# Patient Record
Sex: MALE | Race: WHITE | NOT HISPANIC OR LATINO | Employment: FULL TIME | URBAN - METROPOLITAN AREA
[De-identification: names, ages, dates, MRNs, and addresses within clinical notes are randomized per-mention and may not be internally consistent; named-entity substitution may affect disease eponyms.]

---

## 2019-12-21 ENCOUNTER — OFFICE VISIT (OUTPATIENT)
Dept: URGENT CARE | Facility: URGENT CARE | Age: 24
End: 2019-12-21
Payer: COMMERCIAL

## 2019-12-21 VITALS
HEART RATE: 96 BPM | WEIGHT: 210 LBS | SYSTOLIC BLOOD PRESSURE: 128 MMHG | HEIGHT: 69 IN | OXYGEN SATURATION: 99 % | BODY MASS INDEX: 31.1 KG/M2 | DIASTOLIC BLOOD PRESSURE: 79 MMHG | TEMPERATURE: 97.5 F

## 2019-12-21 DIAGNOSIS — R10.84 ABDOMINAL DISCOMFORT, GENERALIZED: Primary | ICD-10-CM

## 2019-12-21 PROCEDURE — 36415 COLL VENOUS BLD VENIPUNCTURE: CPT | Performed by: FAMILY MEDICINE

## 2019-12-21 PROCEDURE — 99203 OFFICE O/P NEW LOW 30 MIN: CPT | Performed by: FAMILY MEDICINE

## 2019-12-21 PROCEDURE — 80053 COMPREHEN METABOLIC PANEL: CPT | Performed by: FAMILY MEDICINE

## 2019-12-21 ASSESSMENT — MIFFLIN-ST. JEOR: SCORE: 1927.93

## 2019-12-21 NOTE — PROGRESS NOTES
"SUBJECTIVE  Colt Mac is a 24 year old male complaints of abdominal pain:    Onset: 2 - 2.5 weeks ago.  He had been taking a fat-burning supplement for about 5 days when the symptoms started.  He has stopped taking that supplement since.    He has noticed a lot of gas, belching, stomach feels irritated and a little nauseated.  Not really much acid feeling/burning in the throat, more so in the stomach.  He's been avoiding spicy/acid foods.  He had loose stools x 2 and has had intermittent stomach cramps described as \"dull\".  No fevers.  No severe abdominal pain, no dysuria, no hematuria, no discharge from the penis, no chest pain.  No recent uri symptoms.  No excessive alcohol intake. No h/o abdominal surgery.  He was online checking on the supplement and noted several others had complained of stomach cramps while taking it.  He has been taking pepto-bismol for the nausea, last taken yesterday.     OBJECTIVE:  /79   Pulse 96   Temp 97.5  F (36.4  C) (Oral)   Ht 1.753 m (5' 9\")   Wt 95.3 kg (210 lb)   SpO2 99%   BMI 31.01 kg/m    GENERAL APPEARANCE: healthy, alert and no distress  HEAD: NC/AT, EOMI, conjunctiva clear. Oropharynx benign.  RESP: lungs clear to auscultation - no rales, rhonchi or wheezes  CV: regular rates and rhythm, normal S1 S2, no murmur noted  ABDOMEN:  soft, mild generalized tenderness to palpation, not distended and bowel sounds normal  SKIN: no suspicious lesions or rashes      ASSESSMENT/PLAN:    ICD-10-CM    1. Abdominal discomfort, generalized R10.84 Comprehensive metabolic panel (BMP + Alb, Alk Phos, ALT, AST, Total. Bili, TP)     Will send of a CMP with the supplement ingestion prior to onset of symptoms.  Discussed we will call if any elevated/concerning values to have him f/u with his PCP for further evaluation.   Gastritis/stomach discomfort and gassy.  Reviewed symptomatic cares in detail and would anticipate these symptoms resolving over the next several days to a " week.  Recheck with PCP if not.     Patient Instructions     Discontinue the pepto-bismol.   Discontinue the fat-burning supplement.  Avoid spicy, acidic, alcoholic beverages and coffee.   Consider a trial of gas-X and/or Pepcid AC.   Anticipate your symptoms improving over the next week, if not, or if any worsening symptoms develop, recheck with your primary provider for further evaluation.

## 2019-12-21 NOTE — PATIENT INSTRUCTIONS
Discontinue the pepto-bismol.   Discontinue the fat-burning supplement.  Avoid spicy, acidic, alcoholic beverages and coffee.   Consider a trial of gas-X and/or Pepcid AC.   Anticipate your symptoms improving over the next week, if not, or if any worsening symptoms develop, recheck with your primary provider for further evaluation.       Patient Education     Abdominal Pain    Abdominal pain is pain in the stomach or belly area. Everyone has this pain from time to time. In many cases it goes away on its own. But abdominal pain can sometimes be due to a serious problem, such as appendicitis. So it s important to know when to seek help.  Causes of abdominal pain  There are many possible causes of abdominal pain. Common causes in adults include:    Constipation, diarrhea, or gas    Stomach acid flowing back up into the esophagus (acid reflux or heartburn)    Severe acid reflux, called GERD (gastroesophageal reflux disease)    A sore in the lining of the stomach or small intestine (peptic ulcer)    Inflammation of the gallbladder, liver, or pancreas    Gallstones or kidney stones    Appendicitis     Intestinal blockage     An internal organ pushing through a muscle or other tissue (hernia)    Urinary tract infections    In women, menstrual cramps, fibroids, or endometriosis    Inflammation or infection of the intestines  Diagnosing the cause of abdominal pain  Your healthcare provider will do a physical exam help find the cause of your pain. If needed, tests will be ordered. Belly pain has many possible causes. So it can be hard to find the reason for your pain. Giving details about your pain can help. Tell your provider where and when you feel the pain, and what makes it better or worse. Also let your provider know if you have other symptoms such as:    Fever    Tiredness    Upset stomach (nausea)    Vomiting    Changes in bathroom habits  Treating abdominal pain  Some causes of pain need emergency medical treatment  right away. These include appendicitis or a bowel blockage. Other problems can be treated with rest, fluids, or medicines. Your healthcare provider can give you specific instructions for treatment or self-care based on what is causing your pain.  If you have vomiting or diarrhea, sip water or other clear fluids. When you are ready to eat solid foods again, start with small amounts of easy-to-digest, low-fat foods. These include apple sauce, toast, or crackers.   When to seek medical care  Call 911 or go to the hospital right away if you:    Can t pass stool and are vomiting    Are vomiting blood or have bloody diarrhea or black, tarry diarrhea    Have chest, neck, or shoulder pain    Feel like you might pass out    Have pain in your shoulder blades with nausea    Have sudden, severe belly pain    Have new, severe pain unlike any you have felt before    Have a belly that is rigid, hard, and tender to touch  Call your healthcare provider if you have:    Pain for more than 5 days    Bloating for more than 2 days    Diarrhea for more than 5 days    A fever of 100.4 F (38 C) or higher, or as directed by your healthcare provider    Pain that gets worse    Weight loss for no reason    Continued lack of appetite    Blood in your stool  How to prevent abdominal pain  Here are some tips to help prevent abdominal pain:    Eat smaller amounts of food at one time.    Avoid greasy, fried, or other high-fat foods.    Avoid foods that give you gas.    Exercise regularly.    Drink plenty of fluids.  To help prevent GERD symptoms:    Quit smoking.    Reduce alcohol and certain foods that increase stomach acid.    Avoid aspirin and over-the-counter pain and fever medicines (NSAIDS or nonsteroidal anti-inflammatory drugs), if possible    Lose extra weight.    Finish eating at least 2 hours before you go to bed or lie down.    Raise the head of your bed.  Date Last Reviewed: 7/1/2016 2000-2018 [employer]. [address] [place] [address]  [place] [address]. All rights reserved. This information is not intended as a substitute for professional medical care. Always follow your healthcare professional's instructions.

## 2019-12-22 LAB
ALBUMIN SERPL-MCNC: 4.4 G/DL (ref 3.4–5)
ALP SERPL-CCNC: 72 U/L (ref 40–150)
ALT SERPL W P-5'-P-CCNC: 32 U/L (ref 0–70)
ANION GAP SERPL CALCULATED.3IONS-SCNC: 2 MMOL/L (ref 3–14)
AST SERPL W P-5'-P-CCNC: 19 U/L (ref 0–45)
BILIRUB SERPL-MCNC: 0.6 MG/DL (ref 0.2–1.3)
BUN SERPL-MCNC: 15 MG/DL (ref 7–30)
CALCIUM SERPL-MCNC: 8.9 MG/DL (ref 8.5–10.1)
CHLORIDE SERPL-SCNC: 107 MMOL/L (ref 94–109)
CO2 SERPL-SCNC: 30 MMOL/L (ref 20–32)
CREAT SERPL-MCNC: 1.1 MG/DL (ref 0.66–1.25)
GFR SERPL CREATININE-BSD FRML MDRD: >90 ML/MIN/{1.73_M2}
GLUCOSE SERPL-MCNC: 100 MG/DL (ref 70–99)
POTASSIUM SERPL-SCNC: 4.5 MMOL/L (ref 3.4–5.3)
PROT SERPL-MCNC: 7.4 G/DL (ref 6.8–8.8)
SODIUM SERPL-SCNC: 139 MMOL/L (ref 133–144)

## 2020-03-01 ENCOUNTER — APPOINTMENT (OUTPATIENT)
Dept: MRI IMAGING | Facility: CLINIC | Age: 25
End: 2020-03-01
Attending: EMERGENCY MEDICINE
Payer: COMMERCIAL

## 2020-03-01 ENCOUNTER — HOSPITAL ENCOUNTER (EMERGENCY)
Facility: CLINIC | Age: 25
Discharge: HOME | End: 2020-03-01
Attending: EMERGENCY MEDICINE | Admitting: EMERGENCY MEDICINE
Payer: COMMERCIAL

## 2020-03-01 VITALS
HEART RATE: 82 BPM | WEIGHT: 214.4 LBS | OXYGEN SATURATION: 98 % | DIASTOLIC BLOOD PRESSURE: 79 MMHG | TEMPERATURE: 98 F | RESPIRATION RATE: 16 BRPM | BODY MASS INDEX: 31.66 KG/M2 | SYSTOLIC BLOOD PRESSURE: 128 MMHG

## 2020-03-01 DIAGNOSIS — G51.0 BELL'S PALSY: ICD-10-CM

## 2020-03-01 PROCEDURE — 99285 EMERGENCY DEPT VISIT HI MDM: CPT | Mod: Z6 | Performed by: EMERGENCY MEDICINE

## 2020-03-01 PROCEDURE — A9585 GADOBUTROL INJECTION: HCPCS | Performed by: RADIOLOGY

## 2020-03-01 PROCEDURE — 25500064 ZZH RX 255 OP 636: Performed by: RADIOLOGY

## 2020-03-01 PROCEDURE — 96374 THER/PROPH/DIAG INJ IV PUSH: CPT | Mod: 59

## 2020-03-01 PROCEDURE — 25000128 H RX IP 250 OP 636: Performed by: EMERGENCY MEDICINE

## 2020-03-01 PROCEDURE — 99285 EMERGENCY DEPT VISIT HI MDM: CPT | Mod: 25

## 2020-03-01 PROCEDURE — 70553 MRI BRAIN STEM W/O & W/DYE: CPT

## 2020-03-01 RX ORDER — CITALOPRAM HYDROBROMIDE 20 MG/1
20 TABLET ORAL DAILY
COMMUNITY
End: 2023-07-26

## 2020-03-01 RX ORDER — GADOBUTROL 604.72 MG/ML
0.1 INJECTION INTRAVENOUS ONCE
Status: COMPLETED | OUTPATIENT
Start: 2020-03-01 | End: 2020-03-01

## 2020-03-01 RX ORDER — POLYVINYL ALCOHOL 14 MG/ML
2 SOLUTION/ DROPS OPHTHALMIC PRN
Qty: 30 ML | Refills: 1 | Status: SHIPPED | OUTPATIENT
Start: 2020-03-01 | End: 2023-07-26

## 2020-03-01 RX ORDER — LORAZEPAM 2 MG/ML
1 INJECTION INTRAMUSCULAR ONCE
Status: COMPLETED | OUTPATIENT
Start: 2020-03-01 | End: 2020-03-01

## 2020-03-01 RX ORDER — VALACYCLOVIR HYDROCHLORIDE 1 G/1
1000 TABLET, FILM COATED ORAL 3 TIMES DAILY
Qty: 21 TABLET | Refills: 0 | Status: SHIPPED | OUTPATIENT
Start: 2020-03-01 | End: 2023-07-26

## 2020-03-01 RX ORDER — PREDNISONE 20 MG/1
60 TABLET ORAL DAILY
Qty: 21 TABLET | Refills: 0 | Status: SHIPPED | OUTPATIENT
Start: 2020-03-01 | End: 2020-03-08

## 2020-03-01 RX ADMIN — LORAZEPAM 1 MG: 2 INJECTION INTRAMUSCULAR; INTRAVENOUS at 13:43

## 2020-03-01 RX ADMIN — GADOBUTROL 9.7 ML: 604.72 INJECTION INTRAVENOUS at 13:55

## 2020-03-01 ASSESSMENT — ENCOUNTER SYMPTOMS
PHOTOPHOBIA: 0
EYE REDNESS: 0
HEADACHES: 0
ARTHRALGIAS: 0
ACTIVITY CHANGE: 0
WEAKNESS: 1
FATIGUE: 0
NUMBNESS: 1
COLOR CHANGE: 0
FEVER: 0
FACIAL ASYMMETRY: 1
NECK STIFFNESS: 0
SHORTNESS OF BREATH: 0
CHILLS: 0
RHINORRHEA: 0
DIFFICULTY URINATING: 0
VOICE CHANGE: 0
APPETITE CHANGE: 0
ABDOMINAL PAIN: 0
CONFUSION: 0

## 2020-03-01 NOTE — ED AVS SNAPSHOT
[facility], [place], Emergency Department  [address] [place] [id number]  Phone:  [phone]                                    Colt Mac   MRN: [id number]    Department:  [facility], Emergency Department   Date of Visit:  3/1/2020           After Visit Summary Signature Page    I have received my discharge instructions, and my questions have been answered. I have discussed any challenges I see with this plan with the nurse or doctor.    ..........................................................................................................................................  Patient/Patient Representative Signature      ..........................................................................................................................................  Patient Representative Print Name and Relationship to Patient    ..................................................               ................................................  Date                                   Time    ..........................................................................................................................................  Reviewed by Signature/Title    ...................................................              ..............................................  Date                                               Time          22EPIC Rev 08/18

## 2020-03-01 NOTE — ED PROVIDER NOTES
[place] EMERGENCY DEPARTMENT ([facility])  3/01/20   History     Chief Complaint   Patient presents with     Numbness     right side of face since saturday 0900     HPI  Colt Mac is a 24 year old male who presents to the Emergency Department for right facial numbness since yesterday morning.  Patient reports that last Wednesday (4 days ago) he noticed some swelling of the taste buds on his tongue with numbness on right side of the tongue primarily.  He did have some loss of taste on the tongue as well in the left frontal quadrant.  He states that he also started to notice some numbness of the cheek and nostril during this time as well.  More of a partial loss of sensation rather than compete loss.  States that today he noticed the numbness expanding throughout the right side of face.  And today noticed some drift on the left side when smiling and states that the right side of his posterior neck feels a bit weaker and so there is some additional tension to compensate in keeping his head straight. He has noticed some differences in eyelid closure on the right side.  He has not had difficulty with speech, gait impairment, drooling, difficulty drinking liquids, tinnitus or ear pain.  Denies really any other neurological deficits including visual changes, extremity weakness or headache.  Patient states that he is otherwise healthy and does not have any suspicion as to what may be causing his symptoms. Symptoms do not improve after drinking fluids.     I have reviewed the Medications, Allergies, Past Medical and Surgical History, and Social History in the [facility] system.  PAST MEDICAL HISTORY:   Past Medical History:   Diagnosis Date     Anxiety        PAST SURGICAL HISTORY: History reviewed. No pertinent surgical history.    FAMILY HISTORY: History reviewed. No pertinent family history.    SOCIAL HISTORY:   Social History     Tobacco Use     Smoking status: Never Smoker     Smokeless tobacco: Never  Used   Substance Use Topics     Alcohol use: Yes       Patient's Medications   New Prescriptions    POLYVINYL ALCOHOL (LIQUIFILM TEARS) 1.4 % OPHTHALMIC SOLUTION    Place 2 drops into the right eye as needed for dry eyes    PREDNISONE (DELTASONE) 20 MG TABLET    Take 3 tablets (60 mg) by mouth daily for 7 days    VALACYCLOVIR (VALTREX) 1000 MG TABLET    Take 1 tablet (1,000 mg) by mouth 3 times daily for 7 days   Previous Medications    CITALOPRAM (CELEXA) 20 MG TABLET    Take 20 mg by mouth daily    ORDER FOR DME    right youth colles splint   Modified Medications    No medications on file   Discontinued Medications    No medications on file        No Known Allergies     Review of Systems   Constitutional: Negative for activity change, appetite change, chills, fatigue and fever.   HENT: Negative for congestion, rhinorrhea, tinnitus and voice change.    Eyes: Negative for photophobia, redness and visual disturbance.   Respiratory: Negative for shortness of breath.    Cardiovascular: Negative for chest pain.   Gastrointestinal: Negative for abdominal pain.   Genitourinary: Negative for difficulty urinating.   Musculoskeletal: Negative for arthralgias and neck stiffness.   Skin: Negative for color change.   Neurological: Positive for facial asymmetry, weakness (R facial) and numbness (R facial). Negative for headaches.   Psychiatric/Behavioral: Negative for confusion.   All other systems reviewed and are negative.      Physical Exam   BP: (!) 163/98  Pulse: 112  Heart Rate: 108  Temp: 98  F (36.7  C)  Resp: 18  Weight: 97.3 kg (214 lb 6.4 oz)  SpO2: 100 %      Physical Exam  Vitals signs and nursing note reviewed.   Constitutional:       General: He is not in acute distress.     Appearance: He is well-developed.   HENT:      Head: Normocephalic and atraumatic.   Eyes:      General: No scleral icterus.     Conjunctiva/sclera: Conjunctivae normal.      Pupils: Pupils are equal, round, and reactive to light.   Neck:       Musculoskeletal: Neck supple.   Cardiovascular:      Rate and Rhythm: Normal rate and regular rhythm.      Heart sounds: Normal heart sounds.   Pulmonary:      Effort: Pulmonary effort is normal. No respiratory distress.      Breath sounds: Normal breath sounds. No wheezing.   Skin:     General: Skin is warm and dry.   Neurological:      Mental Status: He is alert and oriented to person, place, and time.      GCS: GCS eye subscore is 4. GCS verbal subscore is 5. GCS motor subscore is 6.      Cranial Nerves: Cranial nerve deficit and facial asymmetry present.      Motor: Weakness present.      Coordination: Coordination is intact.      Gait: Gait is intact.      Comments: Patient has rate cranial nerve VII weakness as well as altered sensation in the right V1 through V3 distribution   Psychiatric:         Behavior: Behavior normal.         ED Course   10:41 AM  The patient was seen and examined by Luis E Browne MD in Room ED37.       Procedures        Seen by Neurology       Results for orders placed or performed during the hospital encounter of 03/01/20 (from the past 24 hour(s))   MR Brain w/o & w Contrast    Narrative    Brain MR without and with contrast     Provided History: Right facial paresthesia and right facial weakness  concern for demyelination  ICD-10:    Comparison: None    Technique: Sagittal T2 FLAIR, axial T2 FLAIR, axial diffusion  weighted, and axial T1-weighted images of the brain were obtained  without the administration of intravenous contrast. After the  administration of intravenous contrast, axial T2-weighted, axial T2*,  axial and coronal T1-weighted, and coronal T2 FLAIR images of the  brain were obtained.    Contrast: 9.2cc GADAVIST    Findings: The images demonstrate no mass lesions, midline shift, or  abnormal extraaxial fluid collections. No evidence of abnormal T2  hyperintensity in the intracranial structures to suggest a  demyelinating disease. No suspicious intracranial  enhancement. No  ventriculomegaly. Particularly no enhancement within the vicinity of  7th cranial nerve within the limits of the study.    Normal orbits. Clear paranasal sinuses/mastoid air cells. Grossly  normal vasculature. No abnormal bone marrow signal.      Impression    Impression: No finding to explain patient's symptoms. Essentially a  normal brain MRI with contrast.        Medications   LORazepam (ATIVAN) injection 1 mg (1 mg Intravenous Given 3/1/20 1343)   gadobutrol (GADAVIST) injection 9.73 mL (9.7 mLs Intravenous Given 3/1/20 1355)             Assessments & Plan (with Medical Decision Making)   24 year old male presents with right facial paraesthesias and mild  weakness that could be consistent with Girard' palsy. Neurology was consulted and said that the patient brain MRI with and without contrast was done to rule out other etiology, it is negative. We will discharge him home with prednisone 60mg daily for a week, and valacyclovir 1g 3 times daily for a week. He should follow up with the neurology clinic as an outpatient. He is able to blink and close his right eye. His symptoms could worsen in which case he should be reevaluated. There is no evidence for a stroke, brain tumor, or other serious etiology at this time.     This part of the medical record was transcribed by Tima Hanna, Medical Scribe, from a dictation done by Luis E Browne MD.     I have reviewed the nursing notes.    I have reviewed the findings, diagnosis, plan and need for follow up with the patient.    New Prescriptions    POLYVINYL ALCOHOL (LIQUIFILM TEARS) 1.4 % OPHTHALMIC SOLUTION    Place 2 drops into the right eye as needed for dry eyes    PREDNISONE (DELTASONE) 20 MG TABLET    Take 3 tablets (60 mg) by mouth daily for 7 days    VALACYCLOVIR (VALTREX) 1000 MG TABLET    Take 1 tablet (1,000 mg) by mouth 3 times daily for 7 days       Final diagnoses:   Bell's palsy     MARIXA, Rayray Dickson, am serving as a trained medical  scribe to document services personally performed by Luis E Browne MD, based on the provider's statements to me.   I, Luis E Browne MD, was physically present and have reviewed and verified the accuracy of this note documented by Rayray Dickson.     3/1/2020   [facility], [place], EMERGENCY DEPARTMENT     Luis E Browne MD  03/01/20 1529

## 2020-03-01 NOTE — ED TRIAGE NOTES
Per micki reports  Right sided facial numbness since Saturday at 0900. Patient able to ambulate with steady gait, denies headache, vision changes, extremity weakness and or sensation difficulty, Patient eye brows even, no facial droop noted, tongue midline,

## 2020-03-01 NOTE — CONSULTS
[facility]  Neurology Consultation    Patient Name:  Colt Mac  MRN:  [id number]    :  1995  Date of Service:  2020  Primary care provider:  Stevie Healy      Neurology consultation service was asked to see Colt Mac by Dr. Browne to evaluate right facial symptoms.    History of Present Illness:   24 year old male h/o anxiety who presents with subacute right face numbness and droop. He says he noticed right nostril numbness briefly on , but it went away. Then it came back gradually over the course of  and now involved the cheek. Then on  it was more involving the area around the jaw, forehead and ear, but never the left face, scalp, or below the neck. He noted it on the inside of his mouth, and felt he could not taste on the right side of his tongue at all, but somewhat on the left side. His girlfriend is a CNA in the neuroICU and wanted him to seek care when his right face started to droop. He denies recent illness, rash, fever, headaches.    ROS  A 10-point ROS was performed as per HPI. Pertinent negatives: dysphagia, dysarthria, diplopia, arm or leg sensory or motor complaints, headache, rash, recent illness. Positives: right face numbness and droop    PMH  Past Medical History:   Diagnosis Date     Anxiety      History reviewed. No pertinent surgical history.    Medications   Prior to Admission medications    Medication Sig Last Dose Taking? Auth Provider   citalopram (CELEXA) 20 MG tablet Take 20 mg by mouth daily 3/1/2020 at Unknown time Yes Reported, Patient   ORDER FOR [facility] right youth colles splint   Balgobin, Christopher Lennox Paul, MD     Allergies  No Known Allergies    Social History  Social History     Tobacco Use     Smoking status: Never Smoker     Smokeless tobacco: Never Used   Substance Use Topics     Alcohol use: Yes       Family History    History reviewed. No pertinent family history.      Physical  Examination   Vitals: BP (!) 163/98   Temp 98  F (36.7  C) (Oral)   Resp 18   Wt 97.3 kg (214 lb 6.4 oz)   SpO2 100%   BMI 31.66 kg/m    General: Adult male patient, lying in bed, NAD  HEENT: NC/AT, no icterus, op pink and moist, diminished right eye blink rate. L ear TM well visualized, no abnormalities. R ear, tender and small white tissue mass from anterior wall protruding into EAC, TM not well visualized due to cerumen  Cardiac: RRR  Chest: non-labored on RA  Abdomen: S/NT/ND  Extremities: Warm, no edema  Skin: No rash or lesion   Psych: Mood pleasant, affect congruent  Neuro:  Mental status: Awake, alert, attentive, oriented to self, time, place, and circumstance. Language is fluent and coherent with intact comprehension of complex commands, naming and repetition.  Cranial nerves: VFF, PERRL, conjugate gaze, EOMI, facial sensation diminished to pin and light touch R nose and central cheek, intact V1/V3 and peripheral cheek, L intact to pin and light touch throughout, right lip down in repose, less strong of retraction with smile, buries lashes on L, is able to keep eye shut on right, but lashes are not fully buried, shoulder shrug strong, tongue/uvula midline, no dysarthria.   Motor: Normal bulk and tone. No abnormal movements. 5/5 strength in 4/4 extremities.   Reflexes: Diminished throughout, 1+ and symmetric biceps, brachioradialis, triceps, patellae, and achilles. Negative Higgins, no clonus, toes down-going.  Sensory: Intact to light touch, pin, vibration, and proprioception  Coordination: FNF and HS without ataxia or dysmetria. Rapid alternating movements intact.   Gait: Normal width, stride length, turn, and arm swing. Station normal. Heel, toe, and tandem walk intact.    Investigations   MRI brain w/ and w/o contrast- no evidence of infarct, mass, or contrast enhancement/abnormal signal otherwise of brainstem or cranial nerves    Impression  1. R facial nerve palsy  2. R trigeminal nerve V2/central  face peripheral sparing (trigeminal sensory nucleus onion skinning pattern) hypesthesia    Ddx includes post-viral multiple cranial neuropathies, or CNS demyelinating disease, others. Will need MRI of the brain/stem/cranial nerves to look for contrast enhancement or other etiology of R brainstem/cranial nerve dysfunction.    Recommendations  -MRI brain w/ and w/o contrast  -further recs pending MRI    Update: no evidence of demyelination per MRI brain review. Would recommend treatment of Bell's Palsy: prednisone 60 mg daily x7 days and valacyclovir 1 g TID x7 days  Follow up in General Neurology clinic in 2-4 weeks    Thank you for involving Neurology in the care of Colt Mac.  Please do not hesitate to call with questions/concerns (consult pager [phone]).      Patient was discussed with Dr. Morales.    Caridad Randle MD  Neurology PGY-4  [phone]

## 2020-03-01 NOTE — DISCHARGE INSTRUCTIONS
The brain MRI is normal  Start medications as directed  Use eyedrops in the right eye to prevent dryness, you may want to tape your right eyelid closed at night after instilling 2 drops of lubricant  Follow-up in the Neurology Clinic, see the number below  Please make an appointment to follow up with Neurology Clinic (phone: [phone]) in 14

## 2020-03-22 ENCOUNTER — HEALTH MAINTENANCE LETTER (OUTPATIENT)
Age: 25
End: 2020-03-22

## 2021-01-15 ENCOUNTER — HEALTH MAINTENANCE LETTER (OUTPATIENT)
Age: 26
End: 2021-01-15

## 2021-05-16 ENCOUNTER — HEALTH MAINTENANCE LETTER (OUTPATIENT)
Age: 26
End: 2021-05-16

## 2021-09-05 ENCOUNTER — HEALTH MAINTENANCE LETTER (OUTPATIENT)
Age: 26
End: 2021-09-05

## 2022-06-11 ENCOUNTER — HEALTH MAINTENANCE LETTER (OUTPATIENT)
Age: 27
End: 2022-06-11

## 2022-10-22 ENCOUNTER — HEALTH MAINTENANCE LETTER (OUTPATIENT)
Age: 27
End: 2022-10-22

## 2023-06-18 ENCOUNTER — HEALTH MAINTENANCE LETTER (OUTPATIENT)
Age: 28
End: 2023-06-18

## 2023-07-25 ASSESSMENT — ENCOUNTER SYMPTOMS
ARTHRALGIAS: 0
JOINT SWELLING: 0
SORE THROAT: 0
CONSTIPATION: 0
SHORTNESS OF BREATH: 0
WEAKNESS: 0
CHILLS: 0
HEARTBURN: 0
DYSURIA: 0
HEMATURIA: 0
DIARRHEA: 0
FREQUENCY: 0
NERVOUS/ANXIOUS: 0
EYE PAIN: 0
HEADACHES: 0
DIZZINESS: 0
NAUSEA: 0
COUGH: 0
PALPITATIONS: 0
ABDOMINAL PAIN: 0
HEMATOCHEZIA: 0
PARESTHESIAS: 0
FEVER: 0
MYALGIAS: 0

## 2023-07-26 ENCOUNTER — OFFICE VISIT (OUTPATIENT)
Dept: INTERNAL MEDICINE | Facility: CLINIC | Age: 28
End: 2023-07-26
Payer: COMMERCIAL

## 2023-07-26 VITALS
BODY MASS INDEX: 33.46 KG/M2 | OXYGEN SATURATION: 97 % | RESPIRATION RATE: 14 BRPM | TEMPERATURE: 98.6 F | HEIGHT: 68 IN | SYSTOLIC BLOOD PRESSURE: 131 MMHG | HEART RATE: 84 BPM | DIASTOLIC BLOOD PRESSURE: 81 MMHG | WEIGHT: 220.8 LBS

## 2023-07-26 DIAGNOSIS — Z11.4 SCREENING FOR HIV (HUMAN IMMUNODEFICIENCY VIRUS): ICD-10-CM

## 2023-07-26 DIAGNOSIS — Z00.00 ROUTINE GENERAL MEDICAL EXAMINATION AT A HEALTH CARE FACILITY: Primary | ICD-10-CM

## 2023-07-26 DIAGNOSIS — Z13.220 LIPID SCREENING: ICD-10-CM

## 2023-07-26 DIAGNOSIS — F41.8 PERFORMANCE ANXIETY: ICD-10-CM

## 2023-07-26 DIAGNOSIS — F41.9 ANXIETY: ICD-10-CM

## 2023-07-26 DIAGNOSIS — Z11.59 NEED FOR HEPATITIS C SCREENING TEST: ICD-10-CM

## 2023-07-26 LAB
ERYTHROCYTE [DISTWIDTH] IN BLOOD BY AUTOMATED COUNT: 11.8 % (ref 10–15)
HCT VFR BLD AUTO: 47.9 % (ref 40–53)
HGB BLD-MCNC: 16.8 G/DL (ref 13.3–17.7)
MCH RBC QN AUTO: 29.9 PG (ref 26.5–33)
MCHC RBC AUTO-ENTMCNC: 35.1 G/DL (ref 31.5–36.5)
MCV RBC AUTO: 85 FL (ref 78–100)
PLATELET # BLD AUTO: 223 10E3/UL (ref 150–450)
RBC # BLD AUTO: 5.62 10E6/UL (ref 4.4–5.9)
WBC # BLD AUTO: 6 10E3/UL (ref 4–11)

## 2023-07-26 PROCEDURE — 85027 COMPLETE CBC AUTOMATED: CPT

## 2023-07-26 PROCEDURE — 90471 IMMUNIZATION ADMIN: CPT

## 2023-07-26 PROCEDURE — 90715 TDAP VACCINE 7 YRS/> IM: CPT

## 2023-07-26 PROCEDURE — 80061 LIPID PANEL: CPT

## 2023-07-26 PROCEDURE — 84443 ASSAY THYROID STIM HORMONE: CPT

## 2023-07-26 PROCEDURE — 99214 OFFICE O/P EST MOD 30 MIN: CPT | Mod: 25

## 2023-07-26 PROCEDURE — 87389 HIV-1 AG W/HIV-1&-2 AB AG IA: CPT

## 2023-07-26 PROCEDURE — 86803 HEPATITIS C AB TEST: CPT

## 2023-07-26 PROCEDURE — 80053 COMPREHEN METABOLIC PANEL: CPT

## 2023-07-26 PROCEDURE — 99385 PREV VISIT NEW AGE 18-39: CPT | Mod: 25

## 2023-07-26 PROCEDURE — 36415 COLL VENOUS BLD VENIPUNCTURE: CPT

## 2023-07-26 RX ORDER — PROPRANOLOL HYDROCHLORIDE 10 MG/1
TABLET ORAL
COMMUNITY
End: 2023-07-26

## 2023-07-26 RX ORDER — CITALOPRAM HYDROBROMIDE 20 MG/1
20 TABLET ORAL DAILY
Qty: 90 TABLET | Refills: 3 | Status: SHIPPED | OUTPATIENT
Start: 2023-07-26 | End: 2024-07-29

## 2023-07-26 RX ORDER — PROPRANOLOL HYDROCHLORIDE 10 MG/1
10 TABLET ORAL 3 TIMES DAILY PRN
Qty: 30 TABLET | Refills: 1 | Status: SHIPPED | OUTPATIENT
Start: 2023-07-26

## 2023-07-26 ASSESSMENT — ENCOUNTER SYMPTOMS
HEMATOCHEZIA: 0
DIARRHEA: 0
MYALGIAS: 0
JOINT SWELLING: 0
HEARTBURN: 0
NAUSEA: 0
CONSTIPATION: 0
DYSURIA: 0
SHORTNESS OF BREATH: 0
PALPITATIONS: 0
HEMATURIA: 0
NERVOUS/ANXIOUS: 0
PARESTHESIAS: 0
FEVER: 0
EYE PAIN: 0
FREQUENCY: 0
CHILLS: 0
ABDOMINAL PAIN: 0
ARTHRALGIAS: 0
HEADACHES: 0
COUGH: 0
WEAKNESS: 0
DIZZINESS: 0
SORE THROAT: 0

## 2023-07-26 NOTE — PATIENT INSTRUCTIONS
Exercise, journaling, deepbreathing/meditation and therapy can be helpful for mood.     Noom and weight watchers.         Preventive Health Recommendations  Male Ages 26 - 39    Yearly exam:             See your health care provider every year in order to  o   Review health changes.   o   Discuss preventive care.    o   Review your medicines if your doctor has prescribed any.  You should be tested each year for STDs (sexually transmitted diseases), if you re at risk.   After age 35, talk to your provider about cholesterol testing. If you are at risk for heart disease, have your cholesterol tested at least every 5 years.   If you are at risk for diabetes, you should have a diabetes test (fasting glucose).  Shots: Get a flu shot each year. Get a tetanus shot every 10 years.     Nutrition:  Eat at least 5 servings of fruits and vegetables daily.   Eat whole-grain bread, whole-wheat pasta and brown rice instead of white grains and rice.   Get adequate Calcium and Vitamin D.     Lifestyle  Exercise for at least 150 minutes a week (30 minutes a day, 5 days a week). This will help you control your weight and prevent disease.   Limit alcohol to one drink per day.   No smoking.   Wear sunscreen to prevent skin cancer.   See your dentist every six months for an exam and cleaning.

## 2023-07-26 NOTE — NURSING NOTE
Prior to immunization administration, verified patients identity using patient s name and date of birth. Please see Immunization Activity for additional information.     Screening Questionnaire for Adult Immunization    Are you sick today?   No   Do you have allergies to medications, food, a vaccine component or latex?   No   Have you ever had a serious reaction after receiving a vaccination?   No   Do you have a long-term health problem with heart, lung, kidney, or metabolic disease (e.g., diabetes), asthma, a blood disorder, no spleen, complement component deficiency, a cochlear implant, or a spinal fluid leak?  Are you on long-term aspirin therapy?   No   Do you have cancer, leukemia, HIV/AIDS, or any other immune system problem?   No   Do you have a parent, brother, or sister with an immune system problem?   No   In the past 3 months, have you taken medications that affect  your immune system, such as prednisone, other steroids, or anticancer drugs; drugs for the treatment of rheumatoid arthritis, Crohn s disease, or psoriasis; or have you had radiation treatments?   No   Have you had a seizure, or a brain or other nervous system problem?   No   During the past year, have you received a transfusion of blood or blood    products, or been given immune (gamma) globulin or antiviral drug?   No   For women: Are you pregnant or is there a chance you could become       pregnant during the next month?   No   Have you received any vaccinations in the past 4 weeks?   No     Immunization questionnaire answers were all negative.      Patient instructed to remain in clinic for 15 minutes afterwards, and to report any adverse reactions.     Screening performed by Susan Pino CMA on 7/26/2023 at 4:38 PM.

## 2023-07-26 NOTE — PROGRESS NOTES
SUBJECTIVE:   CC: King is an 28 year old who presents for preventative health visit.       7/26/2023     4:02 PM   Additional Questions   Roomed by Shayy High MA   Accompanied by Himself       Healthy Habits:     Getting at least 3 servings of Calcium per day:  Yes    Bi-annual eye exam:  Yes    Dental care twice a year:  Yes    Sleep apnea or symptoms of sleep apnea:  None    Diet:  Regular (no restrictions)    Frequency of exercise:  2-3 days/week    Duration of exercise:  30-45 minutes    Taking medications regularly:  Yes    Medication side effects:  Other    Additional concerns today:  No        Today's PHQ-2 Score:       7/25/2023    11:03 AM   PHQ-2 ( 1999 [employer])   Q1: Little interest or pleasure in doing things 0   Q2: Feeling down, depressed or hopeless 0   PHQ-2 Score 0   Q1: Little interest or pleasure in doing things Not at all   Q2: Feeling down, depressed or hopeless Not at all   PHQ-2 Score 0       Have you ever done Advance Care Planning? (For example, a Health Directive, POLST, or a discussion with a medical provider or your loved ones about your wishes): No, advance care planning information given to patient to review.  Advanced care planning was discussed at today's visit.    Social History     Tobacco Use    Smoking status: Never    Smokeless tobacco: Never   Substance Use Topics    Alcohol use: Yes           7/25/2023    11:03 AM   Alcohol Use   Prescreen: >3 drinks/day or >7 drinks/week? No     Last PSA: No results found for: PSA    Reviewed orders with patient. Reviewed health maintenance and updated orders accordingly - Yes    Reviewed and updated as needed this visit by clinical staff   Tobacco  Allergies  Meds              Reviewed and updated as needed this visit by Provider                 Past Medical History:   Diagnosis Date    Anxiety     Bell's palsy       Past Surgical History:   Procedure Laterality Date    wisdom teeth         Review of Systems   Constitutional:  Negative  "for chills and fever.   HENT:  Negative for congestion, ear pain, hearing loss and sore throat.    Eyes:  Negative for pain and visual disturbance.   Respiratory:  Negative for cough and shortness of breath.    Cardiovascular:  Negative for chest pain, palpitations and peripheral edema.   Gastrointestinal:  Negative for abdominal pain, constipation, diarrhea, heartburn, hematochezia and nausea.   Genitourinary:  Negative for dysuria, frequency, genital sores, hematuria, impotence, penile discharge and urgency.   Musculoskeletal:  Negative for arthralgias, joint swelling and myalgias.   Skin:  Negative for rash.   Neurological:  Negative for dizziness, weakness, headaches and paresthesias.   Psychiatric/Behavioral:  Negative for mood changes. The patient is not nervous/anxious.      Was taking Celexa for a while then stopped and restarting looking for new prescription       OBJECTIVE:   /81 (BP Location: Right arm, Patient Position: Standing, Cuff Size: Adult Large)   Pulse (!) 121   Temp 98.6  F (37  C) (Oral)   Resp 14   Ht 1.734 m (5' 8.25\")   Wt 100.2 kg (220 lb 12.8 oz)   SpO2 97%   BMI 33.33 kg/m      Physical Exam  GENERAL: alert and no distress  EYES: Eyes grossly normal to inspection  HENT: ear canals and TM's normal, nose and mouth without ulcers or lesions  NECK: no adenopathy, no asymmetry, masses, or scars and thyroid normal to palpation  RESP: lungs clear to auscultation - no rales, rhonchi or wheezes  CV: regular rate and rhythm, normal S1 S2, no S3 or S4, no murmur, click or rub, no peripheral edema and peripheral pulses strong  ABDOMEN: soft, nontender, no hepatosplenomegaly, no masses and bowel sounds normal  MS: no gross musculoskeletal defects noted, no edema  SKIN: no suspicious lesions or rashes  NEURO: Normal strength and tone, mentation intact and speech normal  PSYCH: mentation appears normal, affect normal/bright    ASSESSMENT/PLAN:   (Z00.00) Routine general medical examination " "at a health care facility  (primary encounter diagnosis)  Comment:   Plan: TSH with free T4 reflex, Comprehensive         metabolic panel (BMP + Alb, Alk Phos, ALT, AST,        Total. Bili, TP), CBC with platelets            (Z11.4) Screening for HIV (human immunodeficiency virus)  Comment:   Plan: HIV Antigen Antibody Combo            (Z11.59) Need for hepatitis C screening test  Comment:   Plan: Hepatitis C Screen Reflex to HCV RNA Quant and         Genotype            (F41.9) Anxiety  Comment: Patient feels well on this dose on medication. Tried going off of it but did his mood suffered. Re-order medication. Can place therapy referral at any time  Plan: citalopram (CELEXA) 20 MG tablet, propranolol         (INDERAL) 10 MG tablet            (F41.8) Performance anxiety  Comment: re-order. Rarely uses  Plan: propranolol (INDERAL) 10 MG tablet            (Z13.220) Lipid screening  Comment:   Plan: Lipid panel reflex to direct LDL Non-fasting           COUNSELING:   Reviewed preventive health counseling, as reflected in patient instructions      BMI:   Estimated body mass index is 33.33 kg/m  as calculated from the following:    Height as of this encounter: 1.734 m (5' 8.25\").    Weight as of this encounter: 100.2 kg (220 lb 12.8 oz).       He reports that he has never smoked. He has never used smokeless tobacco.      Sandoval Martin NP  [facility]  "

## 2023-07-27 LAB
ALBUMIN SERPL BCG-MCNC: 5 G/DL (ref 3.5–5.2)
ALP SERPL-CCNC: 100 U/L (ref 40–129)
ALT SERPL W P-5'-P-CCNC: 24 U/L (ref 0–70)
ANION GAP SERPL CALCULATED.3IONS-SCNC: 13 MMOL/L (ref 7–15)
AST SERPL W P-5'-P-CCNC: 27 U/L (ref 0–45)
BILIRUB SERPL-MCNC: 0.3 MG/DL
BUN SERPL-MCNC: 13.1 MG/DL (ref 6–20)
CALCIUM SERPL-MCNC: 9.9 MG/DL (ref 8.6–10)
CHLORIDE SERPL-SCNC: 103 MMOL/L (ref 98–107)
CHOLEST SERPL-MCNC: 190 MG/DL
CREAT SERPL-MCNC: 1.14 MG/DL (ref 0.67–1.17)
DEPRECATED HCO3 PLAS-SCNC: 26 MMOL/L (ref 22–29)
GFR SERPL CREATININE-BSD FRML MDRD: 89 ML/MIN/1.73M2
GLUCOSE SERPL-MCNC: 103 MG/DL (ref 70–99)
HCV AB SERPL QL IA: NONREACTIVE
HDLC SERPL-MCNC: 40 MG/DL
HIV 1+2 AB+HIV1 P24 AG SERPL QL IA: NONREACTIVE
LDLC SERPL CALC-MCNC: 127 MG/DL
NONHDLC SERPL-MCNC: 150 MG/DL
POTASSIUM SERPL-SCNC: 4.3 MMOL/L (ref 3.4–5.3)
PROT SERPL-MCNC: 7.7 G/DL (ref 6.4–8.3)
SODIUM SERPL-SCNC: 142 MMOL/L (ref 136–145)
TRIGL SERPL-MCNC: 116 MG/DL
TSH SERPL DL<=0.005 MIU/L-ACNC: 1.5 UIU/ML (ref 0.3–4.2)

## 2023-12-22 ENCOUNTER — E-VISIT (OUTPATIENT)
Dept: URGENT CARE | Facility: CLINIC | Age: 28
End: 2023-12-22
Payer: COMMERCIAL

## 2023-12-22 ENCOUNTER — LAB (OUTPATIENT)
Dept: LAB | Facility: CLINIC | Age: 28
End: 2023-12-22
Attending: PREVENTIVE MEDICINE
Payer: COMMERCIAL

## 2023-12-22 DIAGNOSIS — J02.9 SORE THROAT: ICD-10-CM

## 2023-12-22 DIAGNOSIS — J02.9 SORE THROAT: Primary | ICD-10-CM

## 2023-12-22 LAB
DEPRECATED S PYO AG THROAT QL EIA: NEGATIVE
FLUAV AG SPEC QL IA: NEGATIVE
FLUBV AG SPEC QL IA: NEGATIVE
GROUP A STREP BY PCR: NOT DETECTED

## 2023-12-22 PROCEDURE — 87635 SARS-COV-2 COVID-19 AMP PRB: CPT

## 2023-12-22 PROCEDURE — 99421 OL DIG E/M SVC 5-10 MIN: CPT | Performed by: PREVENTIVE MEDICINE

## 2023-12-22 PROCEDURE — 87651 STREP A DNA AMP PROBE: CPT

## 2023-12-22 PROCEDURE — 87804 INFLUENZA ASSAY W/OPTIC: CPT

## 2023-12-22 NOTE — PATIENT INSTRUCTIONS
Vargas Malik,    After reviewing your responses, I would like you to come in for a swab to make sure we treat you correctly. This swab is to evaluate you for possible COVID and Strep Throat, and influenza should be scheduled for today or tomorrow. Please use the Schedule Now button in [employer] to schedule your swab. Otherwise, click this link to schedule a lab only appointment.    Lab appointments are not available at most locations on the weekends. If no Lab Only appointment is available, you should be seen in any of our convenient Urgent Care Centers for an in person visit, which can be found on our website here.    You will receive instructions with your results in [employer] once they are available.     If your symptoms worsen, you develop difficulty breathing, difficulty with drinking enough to stay hydrated, difficulty swallowing your saliva or have fevers for more than 5 days, please contact your primary care provider for an appointment or visit an Urgent Care Center to be seen.      Thanks again for choosing us as your health care partner.   Tarun Ruth MD, MD  Sore Throat: Care Instructions  Overview     Infection by bacteria or a virus causes most sore throats. Cigarette smoke, dry air, air pollution, allergies, and yelling can also cause a sore throat. Sore throats can be painful and annoying. Fortunately, most sore throats go away on their own. If you have a bacterial infection, your doctor may prescribe antibiotics.  Follow-up care is a key part of your treatment and safety. Be sure to make and go to all appointments, and call your doctor if you are having problems. It's also a good idea to know your test results and keep a list of the medicines you take.  How can you care for yourself at home?  If your doctor prescribed antibiotics, take them as directed. Do not stop taking them just because you feel better. You need to take the full course of antibiotics.  Gargle with warm salt water  "several times a day to help reduce swelling and relieve pain. Mix 1/2 teaspoon of salt in 1 cup of warm water.  Take an over-the-counter pain medicine, such as acetaminophen (Tylenol), ibuprofen (Advil, Motrin), or naproxen (Aleve). Read and follow all instructions on the label.  Be careful when taking over-the-counter cold or flu medicines and Tylenol at the same time. Many of these medicines have acetaminophen, which is Tylenol. Read the labels to make sure that you are not taking more than the recommended dose. Too much acetaminophen (Tylenol) can be harmful.  Drink plenty of fluids. Fluids may help soothe an irritated throat. Hot fluids, such as tea or soup, may help decrease throat pain.  Use over-the-counter throat lozenges to soothe pain. Regular cough drops or hard candy may also help. These should not be given to young children because of the risk of choking.  Do not smoke or allow others to smoke around you. If you need help quitting, talk to your doctor about stop-smoking programs and medicines. These can increase your chances of quitting for good.  Use a vaporizer or humidifier to add moisture to your bedroom. Follow the directions for cleaning the machine.  When should you call for help?   Call your doctor now or seek immediate medical care if:    You have trouble breathing.     Your sore throat gets much worse on one side.     You have new or worse trouble swallowing.     You have a new or higher fever.   Watch closely for changes in your health, and be sure to contact your doctor if you do not get better as expected.  Where can you learn more?  Go to https://www.[employer].net/patiented  Enter U420 in the search box to learn more about \"Sore Throat: Care Instructions.\"  Current as of: February 28, 2023               Content Version: 13.8    2920-4973 [employer].   Care instructions adapted under license by your healthcare professional. If you have questions about a medical condition or " this instruction, always ask your healthcare professional. [employer] disclaims any warranty or liability for your use of this information.

## 2023-12-23 LAB — SARS-COV-2 RNA RESP QL NAA+PROBE: POSITIVE

## 2023-12-24 ENCOUNTER — TELEPHONE (OUTPATIENT)
Dept: NURSING | Facility: CLINIC | Age: 28
End: 2023-12-24
Payer: COMMERCIAL

## 2023-12-24 NOTE — TELEPHONE ENCOUNTER
Patient classified as COVID treatment eligible by Epic high risk algorithm:  Yes    Coronavirus (COVID-19) Notification    Reason for call  Notify of POSITIVE COVID-19 lab result, assess symptoms,  review [facility] recommendations    Lab Result   Lab test for 2019-nCoV rRt-PCR or SARS-COV-2 PCR  Oropharyngeal AND/OR nasopharyngeal swabs were POSITIVE for 2019-nCoV RNA [OR] SARS-COV-2 RNA (COVID-19) RNA     We have been unable to reach patient by phone at this time to notify of their Positive COVID-19 result.    Left voicemail message requesting a call back to [phone] [facility] for results.        A Positive COVID-19 letter will be sent via "[employer] or the mail.    Jaci Coleman

## 2024-07-29 DIAGNOSIS — F41.9 ANXIETY: ICD-10-CM

## 2024-07-30 RX ORDER — CITALOPRAM HYDROBROMIDE 20 MG/1
20 TABLET ORAL DAILY
Qty: 90 TABLET | Refills: 3 | Status: SHIPPED | OUTPATIENT
Start: 2024-07-30

## 2024-10-20 ENCOUNTER — HEALTH MAINTENANCE LETTER (OUTPATIENT)
Age: 29
End: 2024-10-20